# Patient Record
Sex: FEMALE | NOT HISPANIC OR LATINO | URBAN - METROPOLITAN AREA
[De-identification: names, ages, dates, MRNs, and addresses within clinical notes are randomized per-mention and may not be internally consistent; named-entity substitution may affect disease eponyms.]

---

## 2017-06-17 ENCOUNTER — EMERGENCY (EMERGENCY)
Facility: HOSPITAL | Age: 32
LOS: 1 days | Discharge: HOME | End: 2017-06-17
Attending: EMERGENCY MEDICINE | Admitting: EMERGENCY MEDICINE
Payer: COMMERCIAL

## 2017-06-17 VITALS
RESPIRATION RATE: 16 BRPM | TEMPERATURE: 98 F | HEART RATE: 55 BPM | DIASTOLIC BLOOD PRESSURE: 74 MMHG | OXYGEN SATURATION: 100 % | SYSTOLIC BLOOD PRESSURE: 127 MMHG

## 2017-06-17 PROCEDURE — 99284 EMERGENCY DEPT VISIT MOD MDM: CPT

## 2017-06-17 NOTE — ED ADULT TRIAGE NOTE - CHIEF COMPLAINT QUOTE
pt states that a cyst in her scalp popped 2 days ago causing pain to head, pt states that cyst has white discharge

## 2017-06-17 NOTE — ED PROVIDER NOTE - OBJECTIVE STATEMENT
32y F presents to ED with a cyst on her scalp that popped x2d ago causing headache. Pt also states that cyst has white discharge. Pt has had the cyst for a year but cyst popped after hitting her head on the back of her bed frame. Pt describes pain as a burning pain. Pt took Tylenol and iced scalp, to no relief. Denies f/c.

## 2018-01-07 ENCOUNTER — EMERGENCY (EMERGENCY)
Facility: HOSPITAL | Age: 33
LOS: 1 days | Discharge: HOME | End: 2018-01-07
Admitting: EMERGENCY MEDICINE
Payer: COMMERCIAL

## 2018-01-07 VITALS
SYSTOLIC BLOOD PRESSURE: 126 MMHG | TEMPERATURE: 100 F | DIASTOLIC BLOOD PRESSURE: 75 MMHG | OXYGEN SATURATION: 100 % | HEART RATE: 73 BPM | RESPIRATION RATE: 16 BRPM

## 2018-01-07 PROCEDURE — 99283 EMERGENCY DEPT VISIT LOW MDM: CPT

## 2018-01-07 RX ORDER — IBUPROFEN 200 MG
600 TABLET ORAL ONCE
Qty: 0 | Refills: 0 | Status: COMPLETED | OUTPATIENT
Start: 2018-01-07 | End: 2018-01-07

## 2018-01-07 RX ORDER — DEXAMETHASONE 0.5 MG/5ML
6 ELIXIR ORAL ONCE
Qty: 0 | Refills: 0 | Status: COMPLETED | OUTPATIENT
Start: 2018-01-07 | End: 2018-01-07

## 2018-01-07 RX ADMIN — Medication 600 MILLIGRAM(S): at 17:12

## 2018-01-07 RX ADMIN — Medication 6 MILLIGRAM(S): at 17:12

## 2018-01-07 NOTE — ED ADULT TRIAGE NOTE - CHIEF COMPLAINT QUOTE
Patient c/o of fever of 104.0  cough and having the chills for the past four days pt is afebrile in triage.   Pt took Tylenol @1330.  Pt denies any medical hx.

## 2018-01-07 NOTE — ED PROVIDER NOTE - THROAT FINDINGS
no exudate/THROAT RED/uvula midline/no PTA/NO DROOLING/NO TONGUE ELEVATION/NO STRIDOR/NO VESICLES/ULCERS

## 2018-01-07 NOTE — ED PROVIDER NOTE - OBJECTIVE STATEMENT
33 y/o female no pmh c/o sore throat and fever x4 days. Pt admits to pain with swallowing solids and liquids. Admits to fever as high as 104. Admits to taking tylenol cold and flu with moderate relief. Denies chest pain, sob, n/v/d, change in voice, abd pain, dizziness, weakness, syncope. Denies recent travel or sick contacts. denies flu shot this year

## 2022-07-18 ENCOUNTER — NON-APPOINTMENT (OUTPATIENT)
Age: 37
End: 2022-07-18

## 2022-07-20 ENCOUNTER — NON-APPOINTMENT (OUTPATIENT)
Age: 37
End: 2022-07-20

## 2022-11-29 ENCOUNTER — APPOINTMENT (OUTPATIENT)
Dept: ORTHOPEDIC SURGERY | Facility: CLINIC | Age: 37
End: 2022-11-29

## 2024-07-14 ENCOUNTER — NON-APPOINTMENT (OUTPATIENT)
Age: 39
End: 2024-07-14